# Patient Record
Sex: MALE | Employment: UNEMPLOYED | URBAN - METROPOLITAN AREA
[De-identification: names, ages, dates, MRNs, and addresses within clinical notes are randomized per-mention and may not be internally consistent; named-entity substitution may affect disease eponyms.]

---

## 2024-12-07 ENCOUNTER — HOSPITAL ENCOUNTER (EMERGENCY)
Facility: HOSPITAL | Age: 54
Discharge: HOME | End: 2024-12-07

## 2024-12-07 VITALS
DIASTOLIC BLOOD PRESSURE: 72 MMHG | HEIGHT: 71 IN | WEIGHT: 215 LBS | OXYGEN SATURATION: 100 % | HEART RATE: 87 BPM | RESPIRATION RATE: 20 BRPM | BODY MASS INDEX: 30.1 KG/M2 | SYSTOLIC BLOOD PRESSURE: 149 MMHG | TEMPERATURE: 98.6 F

## 2024-12-07 DIAGNOSIS — K12.1 STOMATITIS: ICD-10-CM

## 2024-12-07 DIAGNOSIS — H66.90 ACUTE OTITIS MEDIA, UNSPECIFIED OTITIS MEDIA TYPE: ICD-10-CM

## 2024-12-07 DIAGNOSIS — K12.0 APHTHOUS ULCER: Primary | ICD-10-CM

## 2024-12-07 PROCEDURE — 99283 EMERGENCY DEPT VISIT LOW MDM: CPT

## 2024-12-07 PROCEDURE — 2500000001 HC RX 250 WO HCPCS SELF ADMINISTERED DRUGS (ALT 637 FOR MEDICARE OP): Performed by: PHYSICIAN ASSISTANT

## 2024-12-07 PROCEDURE — 2500000004 HC RX 250 GENERAL PHARMACY W/ HCPCS (ALT 636 FOR OP/ED): Performed by: PHYSICIAN ASSISTANT

## 2024-12-07 RX ORDER — AMOXICILLIN 500 MG/1
500 CAPSULE ORAL 3 TIMES DAILY
Qty: 21 CAPSULE | Refills: 0 | Status: SHIPPED | OUTPATIENT
Start: 2024-12-07 | End: 2024-12-14

## 2024-12-07 RX ORDER — AMOXICILLIN 500 MG/1
500 CAPSULE ORAL ONCE
Status: COMPLETED | OUTPATIENT
Start: 2024-12-07 | End: 2024-12-07

## 2024-12-07 RX ADMIN — DEXAMETHASONE 10 MG: 6 TABLET ORAL at 12:49

## 2024-12-07 RX ADMIN — AMOXICILLIN 500 MG: 500 CAPSULE ORAL at 12:49

## 2024-12-07 ASSESSMENT — COLUMBIA-SUICIDE SEVERITY RATING SCALE - C-SSRS
1. IN THE PAST MONTH, HAVE YOU WISHED YOU WERE DEAD OR WISHED YOU COULD GO TO SLEEP AND NOT WAKE UP?: NO
6. HAVE YOU EVER DONE ANYTHING, STARTED TO DO ANYTHING, OR PREPARED TO DO ANYTHING TO END YOUR LIFE?: NO
2. HAVE YOU ACTUALLY HAD ANY THOUGHTS OF KILLING YOURSELF?: NO

## 2024-12-07 ASSESSMENT — LIFESTYLE VARIABLES
EVER FELT BAD OR GUILTY ABOUT YOUR DRINKING: NO
HAVE PEOPLE ANNOYED YOU BY CRITICIZING YOUR DRINKING: NO
EVER HAD A DRINK FIRST THING IN THE MORNING TO STEADY YOUR NERVES TO GET RID OF A HANGOVER: NO
HAVE YOU EVER FELT YOU SHOULD CUT DOWN ON YOUR DRINKING: NO
TOTAL SCORE: 0

## 2024-12-07 ASSESSMENT — PAIN - FUNCTIONAL ASSESSMENT: PAIN_FUNCTIONAL_ASSESSMENT: 0-10

## 2024-12-07 ASSESSMENT — PAIN DESCRIPTION - LOCATION: LOCATION: EAR

## 2024-12-07 ASSESSMENT — PAIN DESCRIPTION - PAIN TYPE: TYPE: ACUTE PAIN

## 2024-12-07 ASSESSMENT — PAIN SCALES - GENERAL: PAINLEVEL_OUTOF10: 10 - WORST POSSIBLE PAIN

## 2024-12-07 ASSESSMENT — PAIN DESCRIPTION - ORIENTATION: ORIENTATION: LEFT

## 2024-12-07 NOTE — Clinical Note
Darshan Cook was seen and treated in our emergency department on 12/7/2024.  He may return to work on 12/09/2024.       If you have any questions or concerns, please don't hesitate to call.      Wu Mccord PA-C

## 2024-12-07 NOTE — ED PROVIDER NOTES
HPI   Chief Complaint   Patient presents with    Earache       54-year-old male presented emergency department the chief complaint of pain in his left ear, states he has some abnormal hearing.  Complains of some ulcers on the left side of his tongue and lower lip.  He is well-appearing nontoxic afebrile.  No other complaint.              Patient History   No past medical history on file.  No past surgical history on file.  No family history on file.  Social History     Tobacco Use    Smoking status: Not on file    Smokeless tobacco: Not on file   Substance Use Topics    Alcohol use: Not on file    Drug use: Not on file       Physical Exam   ED Triage Vitals [12/07/24 1156]   Temperature Heart Rate Respirations BP   37 °C (98.6 °F) 87 20 149/72      Pulse Ox Temp Source Heart Rate Source Patient Position   100 % Temporal Monitor Sitting      BP Location FiO2 (%)     Right arm --       Physical Exam  Vitals and nursing note reviewed.   Constitutional:       Appearance: Normal appearance.   HENT:      Head: Normocephalic.      Nose: Nose normal.   Eyes:      Pupils: Pupils are equal, round, and reactive to light.   Cardiovascular:      Rate and Rhythm: Normal rate and regular rhythm.   Pulmonary:      Effort: Pulmonary effort is normal.   Abdominal:      General: Abdomen is flat.   Musculoskeletal:         General: Normal range of motion.      Cervical back: Normal range of motion.   Skin:     General: Skin is warm.   Neurological:      General: No focal deficit present.      Mental Status: He is alert and oriented to person, place, and time.   Psychiatric:         Mood and Affect: Mood normal.           ED Course & MDM   Diagnoses as of 12/07/24 1243   Aphthous ulcer   Stomatitis   Acute otitis media, unspecified otitis media type                 No data recorded     Hue Coma Scale Score: 15 (12/07/24 1220 : Sandra Arboleda RN)                           Medical Decision Making  I have seen and evaluated this  patient.  Physician available for consultation.  Vital signs have been reviewed.  All laboratory and diagnostic imaging is reviewed by myself and interpreted by myself unless otherwise stated.  Additionally imaging is interpreted by radiologist.    Has some fluid and bulging and erythematous left tympanic membrane.  He has some ulceration on his tongue and lip consistent with stomatitis versus aphthous ulcer.  Placed on steroid antibiotic with lidocaine.  Released in stable condition.        Labs Reviewed - No data to display  No orders to display     Medications   amoxicillin (Amoxil) capsule 500 mg (has no administration in time range)   dexAMETHasone (Decadron) tablet 10 mg (has no administration in time range)     New Prescriptions    AMOXICILLIN (AMOXIL) 500 MG CAPSULE    Take 1 capsule (500 mg) by mouth 3 times a day for 7 days.    MODIFIED MAGIC MOUTHWASH (NYSTATIN, DIPHENHYDRAMINE, MAALOX 1:2:3) 1:1:1 SUSPENSION    Swish and spit 15 mL every 6 hours if needed for stomatitis.         Procedure  Procedures     Wu Mccord PA-C  12/07/24 [id number]

## 2024-12-07 NOTE — ED TRIAGE NOTES
Pt sts left sided ear pain and pressure. Then a day later pt noticed a bunch of cold sores forming in his mouth on the left side.